# Patient Record
Sex: MALE | Race: WHITE | Employment: FULL TIME | URBAN - METROPOLITAN AREA
[De-identification: names, ages, dates, MRNs, and addresses within clinical notes are randomized per-mention and may not be internally consistent; named-entity substitution may affect disease eponyms.]

---

## 2021-10-07 ENCOUNTER — APPOINTMENT (OUTPATIENT)
Dept: GENERAL RADIOLOGY | Age: 51
End: 2021-10-07
Payer: COMMERCIAL

## 2021-10-07 ENCOUNTER — HOSPITAL ENCOUNTER (EMERGENCY)
Age: 51
Discharge: HOME | End: 2021-10-07
Attending: EMERGENCY MEDICINE
Payer: COMMERCIAL

## 2021-10-07 VITALS
OXYGEN SATURATION: 97 % | DIASTOLIC BLOOD PRESSURE: 84 MMHG | SYSTOLIC BLOOD PRESSURE: 108 MMHG | TEMPERATURE: 98.6 F | BODY MASS INDEX: 24.82 KG/M2 | RESPIRATION RATE: 15 BRPM | WEIGHT: 173 LBS | HEART RATE: 103 BPM

## 2021-10-07 DIAGNOSIS — R07.9 CHEST PAIN, UNSPECIFIED TYPE: Primary | ICD-10-CM

## 2021-10-07 PROBLEM — R93.1 ELEVATED CORONARY ARTERY CALCIUM SCORE: Status: ACTIVE | Noted: 2021-10-07

## 2021-10-07 PROBLEM — R07.2 PRECORDIAL PAIN: Status: ACTIVE | Noted: 2021-10-07

## 2021-10-07 LAB
A/G RATIO: 1.6 (ref 1.1–2.2)
ALBUMIN SERPL-MCNC: 5 G/DL (ref 3.4–5)
ALP BLD-CCNC: 69 U/L (ref 40–129)
ALT SERPL-CCNC: 18 U/L (ref 10–40)
ANION GAP SERPL CALCULATED.3IONS-SCNC: 11 MMOL/L (ref 3–16)
AST SERPL-CCNC: 21 U/L (ref 15–37)
BASOPHILS ABSOLUTE: 0.1 K/UL (ref 0–0.2)
BASOPHILS RELATIVE PERCENT: 1 %
BILIRUB SERPL-MCNC: 0.7 MG/DL (ref 0–1)
BUN BLDV-MCNC: 12 MG/DL (ref 7–20)
CALCIUM SERPL-MCNC: 9.6 MG/DL (ref 8.3–10.6)
CHLORIDE BLD-SCNC: 100 MMOL/L (ref 99–110)
CO2: 26 MMOL/L (ref 21–32)
CREAT SERPL-MCNC: 0.8 MG/DL (ref 0.9–1.3)
EKG ATRIAL RATE: 104 BPM
EKG DIAGNOSIS: NORMAL
EKG P AXIS: 71 DEGREES
EKG P-R INTERVAL: 146 MS
EKG Q-T INTERVAL: 328 MS
EKG QRS DURATION: 72 MS
EKG QTC CALCULATION (BAZETT): 431 MS
EKG R AXIS: 66 DEGREES
EKG T AXIS: 44 DEGREES
EKG VENTRICULAR RATE: 104 BPM
EOSINOPHILS ABSOLUTE: 0.1 K/UL (ref 0–0.6)
EOSINOPHILS RELATIVE PERCENT: 0.9 %
GFR AFRICAN AMERICAN: >60
GFR NON-AFRICAN AMERICAN: >60
GLOBULIN: 3.1 G/DL
GLUCOSE BLD-MCNC: 131 MG/DL (ref 70–99)
HCT VFR BLD CALC: 45.5 % (ref 40.5–52.5)
HEMOGLOBIN: 15.8 G/DL (ref 13.5–17.5)
LYMPHOCYTES ABSOLUTE: 1.4 K/UL (ref 1–5.1)
LYMPHOCYTES RELATIVE PERCENT: 24.2 %
MCH RBC QN AUTO: 29.1 PG (ref 26–34)
MCHC RBC AUTO-ENTMCNC: 34.7 G/DL (ref 31–36)
MCV RBC AUTO: 83.8 FL (ref 80–100)
MONOCYTES ABSOLUTE: 0.4 K/UL (ref 0–1.3)
MONOCYTES RELATIVE PERCENT: 7.6 %
NEUTROPHILS ABSOLUTE: 3.9 K/UL (ref 1.7–7.7)
NEUTROPHILS RELATIVE PERCENT: 66.3 %
PDW BLD-RTO: 13.2 % (ref 12.4–15.4)
PLATELET # BLD: 212 K/UL (ref 135–450)
PMV BLD AUTO: 7 FL (ref 5–10.5)
POTASSIUM SERPL-SCNC: 3.9 MMOL/L (ref 3.5–5.1)
RBC # BLD: 5.43 M/UL (ref 4.2–5.9)
SODIUM BLD-SCNC: 137 MMOL/L (ref 136–145)
TOTAL PROTEIN: 8.1 G/DL (ref 6.4–8.2)
TROPONIN: <0.01 NG/ML
TROPONIN: <0.01 NG/ML
WBC # BLD: 5.8 K/UL (ref 4–11)

## 2021-10-07 PROCEDURE — 99285 EMERGENCY DEPT VISIT HI MDM: CPT

## 2021-10-07 PROCEDURE — 93017 CV STRESS TEST TRACING ONLY: CPT

## 2021-10-07 PROCEDURE — 71045 X-RAY EXAM CHEST 1 VIEW: CPT

## 2021-10-07 PROCEDURE — 84484 ASSAY OF TROPONIN QUANT: CPT

## 2021-10-07 PROCEDURE — 93010 ELECTROCARDIOGRAM REPORT: CPT | Performed by: INTERNAL MEDICINE

## 2021-10-07 PROCEDURE — 80053 COMPREHEN METABOLIC PANEL: CPT

## 2021-10-07 PROCEDURE — 85025 COMPLETE CBC W/AUTO DIFF WBC: CPT

## 2021-10-07 PROCEDURE — 93005 ELECTROCARDIOGRAM TRACING: CPT | Performed by: EMERGENCY MEDICINE

## 2021-10-07 PROCEDURE — 99284 EMERGENCY DEPT VISIT MOD MDM: CPT | Performed by: INTERNAL MEDICINE

## 2021-10-07 RX ORDER — METOPROLOL SUCCINATE 25 MG/1
25 TABLET, EXTENDED RELEASE ORAL DAILY
Qty: 30 TABLET | Refills: 3 | Status: SHIPPED | OUTPATIENT
Start: 2021-10-07 | End: 2022-06-27

## 2021-10-07 ASSESSMENT — PAIN SCALES - GENERAL: PAINLEVEL_OUTOF10: 1

## 2021-10-07 NOTE — ED NOTES
Stress lab called, stated they are ready for patient. Ailyn transported patient to stress lab.      Lino León RN  10/07/21 6409

## 2021-10-07 NOTE — ED NOTES
Called cards @ [phone]   Re: chest pain, abnormal cardiac CT  Dr. Jaky Rojo called back @ [address] [facility]  10/07/21 7385

## 2021-10-07 NOTE — ED PROVIDER NOTES
EMERGENCY DEPARTMENT ENCOUNTER      This patient was not seen and evaluated by the attending physician. Pt Name: Ralph Ch  MRN: [id number]  [place] 1970  Date of evaluation: 10/7/2021  Provider: ANDRAE Lopez - CNP-C  PCP: Katja Weiss MD  ED Attending: Dr. Levy Quinones    History provided by the patient. CHIEF COMPLAINT:     Chief Complaint   Patient presents with    Chest Pain     started last night, left side chest pain,       HISTORY OF PRESENT ILLNESS:      Ralph Ch is a 46 y.o. male who presents [facility]  ED with complaints of chest pain, patient states that he developed chest pain last night, into this morning, describes a left-sided chest pain, radiates across to his chest. Patient says he had a recent CT cardiac which showed a calcium score that was \"off the chart\". Patient has not had any type of cardiac evaluations, stress test or angiography. Does not have an active cardiologist. He is here for further evaluation. Location:-  Quality:-  Severity:-  Duration:-  Modifying factors:-    Nursing Notes were reviewed     REVIEW OF SYSTEMS:     Review of Systems  All systems, atotal of 10, are reviewed and negative except for those that were just noted in history present illness. PAST MEDICAL HISTORY:   History reviewed. No pertinent past medical history. SURGICAL HISTORY:    History reviewed. No pertinent surgical history. CURRENT MEDICATIONS:       Discharge Medication List as of 10/7/2021  2:00 PM      CONTINUE these medications which have NOT CHANGED    Details   Fluticasone Propionate (FLONASE NA) by Nasal routeHistorical Med               ALLERGIES:    Penicillins    FAMILY HISTORY:     History reviewed. No pertinent family history.        SOCIAL HISTORY:       Social History     Socioeconomic History    Marital status:      Spouse name: None    Number of children: None    Years of education: None    Highest education level: bilaterally. No nasal discharge. Oropharynx clear, no erythema. Mucous membranes moist.  EYES: Conjunctiva non-injected, no lid abnormalities noted. No scleral icterus. PERRL. EOM's grossly intact. Anterior chambers clear. NECK: Supple. Normal ROM. No meningismus. No thyroid tenderness or swelling noted. CARDIOVASCULAR: RRR. No Murmer. PULMONARY/CHEST WALL: Effort normal. No tachypnea. Lungs clear to ausculation. ABDOMEN: Normal BS. Soft. Nondistended. No tenderness to palpate. No guarding. No hernias noted. No splenomegaly. Back: Spine is midline. No ecchymosis. No crepitus on palpation. No obvious subluxation of vertebral column. No saddle anesthesia or evidence of cauda equina. /ANORECTAL: Not assessed  MUSKULOSKELETAL: Normal ROM. No acute deformities. No edema. No tenderness to palpate. SKIN: Warm and dry. NEUROLOGICAL:  GCS 15. CN II-XII grossly intact. Strength is 5/5 in allextremities and sensation is intact. PSYCHIATRIC: Normal affect, normal insight and judgement. Alert andoriented x 3.         DIAGNOSTIC RESULTS:     LABS:    Results for orders placed or performed during the hospital encounter of 10/07/21   CBC Auto Differential   Result Value Ref Range    WBC 5.8 4.0 - 11.0 K/uL    RBC 5.43 4.20 - 5.90 M/uL    Hemoglobin 15.8 13.5 - 17.5 g/dL    Hematocrit 45.5 40.5 - 52.5 %    MCV 83.8 80.0 - 100.0 fL    MCH 29.1 26.0 - 34.0 pg    MCHC 34.7 31.0 - 36.0 g/dL    RDW 13.2 12.4 - 15.4 %    Platelets 722 712 - 530 K/uL    MPV 7.0 5.0 - 10.5 fL    Neutrophils % 66.3 %    Lymphocytes % 24.2 %    Monocytes % 7.6 %    Eosinophils % 0.9 %    Basophils % 1.0 %    Neutrophils Absolute 3.9 1.7 - 7.7 K/uL    Lymphocytes Absolute 1.4 1.0 - 5.1 K/uL    Monocytes Absolute 0.4 0.0 - 1.3 K/uL    Eosinophils Absolute 0.1 0.0 - 0.6 K/uL    Basophils Absolute 0.1 0.0 - 0.2 K/uL   Comprehensive Metabolic Panel   Result Value Ref Range    Sodium 137 136 - 145 mmol/L    Potassium 3.9 3.5 - 5.1 mmol/L    Chloride 100 99 - 110 mmol/L    CO2 26 21 - 32 mmol/L    Anion Gap 11 3 - 16    Glucose 131 (H) 70 - 99 mg/dL    BUN 12 7 - 20 mg/dL    CREATININE 0.8 (L) 0.9 - 1.3 mg/dL    GFR Non-African American >60 >60    GFR African American >60 >60    Calcium 9.6 8.3 - 10.6 mg/dL    Total Protein 8.1 6.4 - 8.2 g/dL    Albumin 5.0 3.4 - 5.0 g/dL    Albumin/Globulin Ratio 1.6 1.1 - 2.2    Total Bilirubin 0.7 0.0 - 1.0 mg/dL    Alkaline Phosphatase 69 40 - 129 U/L    ALT 18 10 - 40 U/L    AST 21 15 - 37 U/L    Globulin 3.1 g/dL   Troponin   Result Value Ref Range    Troponin <0.01 <0.01 ng/mL   Troponin   Result Value Ref Range    Troponin <0.01 <0.01 ng/mL   EKG 12 Lead   Result Value Ref Range    Ventricular Rate 104 BPM    Atrial Rate 104 BPM    P-R Interval 146 ms    QRS Duration 72 ms    Q-T Interval 328 ms    QTc Calculation (Bazett) 431 ms    P Axis 71 degrees    R Axis 66 degrees    T Axis 44 degrees    Diagnosis       Sinus tachycardiaPossible Left atrial enlargementBorderline ECGNo previous ECGs availableConfirmed by Yvonne Freed ([id number]) on 10/7/2021 12:39:40 PM         RADIOLOGY:  All x-ray studies are viewed/reviewedby me. Formal interpretations per the radiologist are as follows:      XR CHEST PORTABLE   Final Result   1. No acute abnormality. EKG:  See EKG interpretation by an attending phsyician      PROCEDURES:   N/A    CRITICAL CARE TIME:   Total critical care time today provided was at least 35 minutes. This excludes seperately billable procedure. Critical care time provided for chest pain concerning for possible ACS that got a stress test from ER that required close evaluation and/or intervention with concern for patient decompensation.      CONSULTS:  IP CONSULT TO CARDIOLOGY      EMERGENCYDEPARTMENT COURSE and DIFFERENTIAL DIAGNOSIS/MDM:   Vitals:    Vitals:    10/07/21 1153 10/07/21 1302 10/07/21 1332 10/07/21 1401   BP: 129/85 117/81 118/78 108/84   Pulse: 91 107 102 103   Resp: 16 13 23 15   Temp: TempSrc:       SpO2: 98% 96% 96% 97%   Weight:           Patient was given the following medications:  Medications - No data to display      Patient was evaluated by both myself and Dr. Danae Lockwood  Patient presented to the emergency room today with complaints of chest pain. Patient has considerable risk factors for acute coronary syndrome, I did consult cardiology on this patient, spoke with Dr. Aruna Mcrae, he did come to the bedside and evaluated the patient, feels that a stress test from the ER is appropriate for this patient. Patient laboratory studies showed 2 - troponins, otherwise normal labs. Patient had a exercise stress today which was interpreted by cardiology, the did contact me personally to go over results, they do feel comfortable with discharge home, normal stress test.  Patient was given referral to follow-up with cardiology as an outpatient. He was given strict return precautions, he verbalized understanding of the plan of care and agreed with it. Patient laboratory studies, radiographic imaging, andassessment were all discussed with the patient and/or patient family. There was shared decision-making between myself, the attending physician, as well as the patient and/or their surrogate and we are all in agreement with discharge home. There was an opportunity for questions and all questions were answered to the best of my ability and to the satisfaction of the patient and/or patient family. FINAL IMPRESSION:      1.  Chest pain, unspecified type          DISPOSITION/PLAN:   DISPOSITIONDecision To Discharge      PATIENT REFERRED TO:  MD Anil Marx [address]  [address] [facility]  [phone]    Call   For follow up      DISCHARGE MEDICATIONS:  Discharge Medication List as of 10/7/2021  2:00 PM      START taking these medications    Details   metoprolol succinate (TOPROL XL) 25 MG extended release tablet Take 1 tablet by mouth daily, Disp-30 tablet, R-3Print (Please note that portions of this note were completed with a voice recognition program.  Efforts were made to edit the dictations, but occasionally words are mis-transcribed.)    ANDRAE Crooks (electronically signed)        ANDRAE Crooks CNP  10/08/21 6121

## 2021-10-07 NOTE — ED NOTES
Verbal and written discharge instructions given. IV and telemetry monitor removed. Prescription given to patient. Patient ambulated out of department. Patient in stable condition, discharged home.        Erin Cardenas RN  10/07/21 1459

## 2021-10-07 NOTE — CONSULTS
[address] [facility]   Cardiovascular Evaluation    PATIENT: Regina Wilson  DATE: 10/7/2021  MRN: [id number]  CSN: [id number]  : 1970    Primary Care Doctor/Referring provider: Teresa Antony MD, No admitting provider for patient encounter. Reason for evaluation/Chief complaint:   Chest Pain (started last night, left side chest pain,)      Subjective:    History of present illness on initial date of evaluation:   Regina Wilson is a 46 y.o. patient who presents for the evaluation of chest pain. Patient states that he had noted onset of left-sided chest pain with stress. Patient had noticed intermittent symptoms for several weeks but did not seem concerned. He states that he visited his primary care provider couple weeks ago and was referred for CT calcium score. He got the results of his CT calcium score a couple days ago and since that time his had increasing concern and progressive symptoms. The symptoms were prompted him to seek medical evaluation in the emergency room. In the emergency room here his initial EKG is unremarkable. He does have normal troponin levels. He is currently chest pain-free. We have been asked to review the patient's clinical situation and determine need for further cardiovascular investigation and testing. In discussing with the patient, he states that he does have chest pain located on the left side with occasional radiation to the left arm. This is typically with stressful situations. He states that he got little bit more worried about his symptomatology after getting his calcium score. Patient Active Problem List   Diagnosis    Precordial pain    Elevated coronary artery calcium score         Cardiac Testing: I have reviewed the findings below. EKG:  ECHO:   STRESS TEST:  CATH:  BYPASS:  VASCULAR:    Past Medical History:   has no past medical history on file. Surgical History:   has no past surgical history on file.      Social History:   reports that he has never smoked. He has never used smokeless tobacco. He reports that he does not drink alcohol and does not use drugs. Family History:  No evidence for sudden cardiac death or premature CAD    Medications:  Reviewed and are listed in nursing record. and/or listed below  Outpatient Medications:  Prior to Admission medications    Medication Sig Start Date End Date Taking? Authorizing Provider   Fluticasone Propionate (FLONASE NA) by Nasal route    Historical Provider, MD       In-patient schedule medications:        Infusion Medications: Allergies:  Penicillins     Review of Systems:   All 14 point review of symptoms completed. Pertinent positives identified in the HPI, all other review of symptoms findings as below.      Review of Systems - History obtained from the patient  General ROS: negative for - chills, fever or night sweats  Psychological ROS: negative for - disorientation or hallucinations  Ophthalmic ROS: negative for - dry eyes, eye pain or loss of vision  ENT ROS: negative for - nasal discharge or sore throat  Allergy and Immunology ROS: negative for - hives or itchy/watery eyes  Hematological and Lymphatic ROS: negative for - jaundice or night sweats  Endocrine ROS: negative for - mood swings or temperature intolerance  Breast ROS: deferred  Respiratory ROS: negative for - hemoptysis or stridor  Gastrointestinal ROS: no abdominal pain, change in bowel habits, or black or bloody stools  Genito-Urinary ROS: no dysuria, trouble voiding, or hematuria  Musculoskeletal ROS: negative for - gait disturbance, joint pain or joint stiffness  Neurological ROS: negative for - seizures or speech problems  Dermatological ROS: negative for - rash or skin lesion changes      Physical Examination:    [unfilled]  /85   Pulse 91   Temp 98.6 °F (37 °C) (Oral)   Resp 16   Wt 173 lb (78.5 kg)   SpO2 98%   BMI 24.82 kg/m²    Weight: 173 lb (78.5 kg)     Wt Readings from Last 3 Encounters:   10/07/21 173 lb (78.5 kg)   02/15/18 175 lb (79.4 kg)     No intake or output data in the 24 hours ending 10/07/21 1219    General Appearance:  Alert, cooperative, no distress, appears stated age   Head:  Normocephalic, without obvious abnormality, atraumatic   Eyes:  PERRL, conjunctiva/corneas clear       Nose: Nares normal, no drainage or sinus tenderness   Throat: Lips, mucosa, and tongue normal   Neck: Supple, symmetrical, trachea midline, no adenopathy, thyroid: not enlarged, symmetric, no tenderness/mass/nodules, no carotid bruit or JVD       Lungs:   Clear to auscultation bilaterally, respirations unlabored   Chest Wall:  No tenderness or deformity   Heart:  Regular rhythm and normal rate; S1, S2 are normal; no murmur noted; no rub or gallop   Abdomen:   Soft, non-tender, bowel sounds active all four quadrants,  no masses, no organomegaly           Extremities: Extremities normal, atraumatic, no cyanosis or edema   Pulses: 2+ and symmetric   Skin: Skin color, texture, turgor normal, no rashes or lesions   Pysch: Normal mood and affect   Neurologic: Normal gross motor and sensory exam.         Labs  Recent Labs     10/07/21  0802   WBC 5.8   HGB 15.8   HCT 45.5   MCV 83.8        Recent Labs     10/07/21  0802   CREATININE 0.8*   BUN 12      K 3.9      CO2 26     No results for input(s): INR, PROTIME in the last 72 hours. Recent Labs     10/07/21  0802 10/07/21  1117   TROPONINI <0.01 <0.01     Invalid input(s): PRO-BNP  No results for input(s): CHOL, HDL in the last 72 hours. Invalid input(s): LDL, TG      Imaging:  I have reviewed the below testing personally and my interpretation is below. EKG:   Sinus tachycardia  Possible Left atrial enlargement  Borderline ECG  No previous ECGs available    CXR:      Assessment:  46 y.o. patient with:  Active Problems:    Precordial pain    Elevated coronary artery calcium score  Resolved Problems:    * No resolved hospital problems. *          Plan:  1.  The patient's symptoms and associated risk factors suggest an intermediate probability of ischemic heart disease as the cause for the symptoms. I recommend the patient undergo non-invasive evaluation with stress testing to further evaluate the symptoms. ~with normal EKG will proceed with plain GXT   2. Serial troponin levels will be ordered and reviewed  3. The patient has been given instructions on addressing diet, regular exercise, weight control, smoking abstention, medication compliance, and stress minimization. 4. The patient should be treated with these therapies unless contraindicated:   ~asa daily   ~high intensity statin therapy  5. After review of these tests, further recommendations regarding care will be given. Medical Decision Making: The following items were considered in medical decision making:  Independent review of images  Review / order clinical lab tests  Review / order radiology tests  Decision to obtain old records  Review and summation of old records as accessed through [place] (a summary of my findings in these old records)      Time Based Itemization  A total of 60 minutes was spent on today's patient encounter. If applicable, non-patient-facing activities:  (X )Preparing to see the patient and reviewing records  (X ) Individual interpretation of results  ( ) Discussion or coordination of care with other health care professionals  ( ) Ordering of unique tests, medications, or procedures  (X ) Documentation within the EHR       All questions and concerns were addressed to the patient/family. Alternatives to my treatment were discussed. The note was completed using EMR. Every effort was made to ensure accuracy; however, inadvertent computerized transcription errors may be present.     Cam Bansal MD, [phone], [place] [phone] [facility]  [phone] [facility]  10/7/2021  12:19 PM

## 2021-10-08 NOTE — ED PROVIDER NOTES
Emergency Department Attending Provider Note  Location: [address] [place] [address]  ED  10/7/2021     Patient Identification  Mika Nair is a 46 y.o. male      Mika Nair was evaluated in the Emergency Department for exertional chest pain over the past few weeks. CT calcium score elevated ordered by primary physician. No active CP. Physical exam revealed:  Vital signs reviewed  Gen: Alert and oriented, no acute distress  Card: RRR, no murmurs, equal radial pulses  Resp: CBSBL, no wheezes rales or rhonchi  Abd: Soft nontender, nondistended abdomen, no guarding or rebound, no CVA tenderness  Ext: No deformities noted  Neuro: Grossly normal moving extremities equally      EKG Interpretation  Normal sinus rhythm rate 100 normal axis normal intervals no evidence of conduction abnormalities or diagnostic ischemic changes noted,     Patient seen and evaluated. Relevant records reviewed. 43-year-old male presents with exertional chest pain over the past few weeks and elevated CT calcium score. No active chest pain. Patient well-appearing on exam borderline tachycardic otherwise reassuring vitals. Unremarkable physical exam.  EKG does not show any acute ischemic pattern negative troponin x2. NAZIA discussed case with cardiology who recommends stress test today and plan for discharge with appropriate follow-up outpatient stress test is negative. I was looking for PE dissection on T preliminary process require emergent intervention. Although initial history and physical exam information was obtained by NAZIA/NPP/MD/DO (who also dictated a record of this visit), I independently examined and evaluated this patient and made all diagnostic, treatment, and disposition decisions. This chart was generated in part by using Dragon Dictation system and may contain errors related to that system including errors in grammar, punctuation, and spelling, as well as words and phrases that may be inappropriate.  If there are any questions or concerns please feel free to contact the dictating provider for clarification.      Andria Gardiner MD  [address] MD  10/08/21 9950

## 2021-10-13 ENCOUNTER — OFFICE VISIT (OUTPATIENT)
Dept: CARDIOLOGY CLINIC | Age: 51
End: 2021-10-13
Payer: COMMERCIAL

## 2021-10-13 VITALS
SYSTOLIC BLOOD PRESSURE: 118 MMHG | WEIGHT: 176 LBS | DIASTOLIC BLOOD PRESSURE: 76 MMHG | HEART RATE: 81 BPM | OXYGEN SATURATION: 98 % | HEIGHT: 70 IN | BODY MASS INDEX: 25.2 KG/M2

## 2021-10-13 DIAGNOSIS — R07.2 PRECORDIAL PAIN: Primary | ICD-10-CM

## 2021-10-13 DIAGNOSIS — R93.1 ELEVATED CORONARY ARTERY CALCIUM SCORE: ICD-10-CM

## 2021-10-13 PROCEDURE — G8427 DOCREV CUR MEDS BY ELIG CLIN: HCPCS | Performed by: INTERNAL MEDICINE

## 2021-10-13 PROCEDURE — G8419 CALC BMI OUT NRM PARAM NOF/U: HCPCS | Performed by: INTERNAL MEDICINE

## 2021-10-13 PROCEDURE — 1036F TOBACCO NON-USER: CPT | Performed by: INTERNAL MEDICINE

## 2021-10-13 PROCEDURE — 99214 OFFICE O/P EST MOD 30 MIN: CPT | Performed by: INTERNAL MEDICINE

## 2021-10-13 PROCEDURE — G8484 FLU IMMUNIZE NO ADMIN: HCPCS | Performed by: INTERNAL MEDICINE

## 2021-10-13 PROCEDURE — 3017F COLORECTAL CA SCREEN DOC REV: CPT | Performed by: INTERNAL MEDICINE

## 2021-10-13 RX ORDER — ATORVASTATIN CALCIUM 20 MG/1
20 TABLET, FILM COATED ORAL DAILY
COMMUNITY
Start: 2021-10-05 | End: 2021-11-09 | Stop reason: SDUPTHER

## 2021-10-13 NOTE — PROGRESS NOTES
[address] [facility]   Cardiovascular Evaluation    PATIENT: Jhoana Pascual  DATE: 10/13/2021  MRN: <[id number]>  CSN: [id number]  : 1970    Primary Care Doctor/Referring provider: Ronny Patel MD, No admitting provider for patient encounter. Reason for evaluation/Chief complaint:   Follow-Up from Hospital      Subjective: Mr Leonard Antonio presents for follow up for chest pain. History of present illness on initial date of evaluation:   Jhoana Pascual is a 46 y.o. presented to the ED on 10/7/2021 with complaints of chest pain. Prior to ED visit, he had seen his PCP who ordered a CT calcium score which demonstrated a total calcium score of 897. A stress test was ordered in the ED. Patient underwent stress test on 10/7/2021 which was normal.   Today, he states that he feels well. He states that he does have a lot of anxiety and stress. He also wonders if his chest pain is related to a pulled muscle from exercise. He has recently changed his diet to low sodium and low sugar. He reports that he is taking his medications as prescribed. Patient denies current edema, sob, palpitations, dizziness or syncope. Patient Active Problem List   Diagnosis    Precordial pain    Elevated coronary artery calcium score         Cardiac Testing: I have reviewed the findings below. EKG:  ECHO:   STRESS TEST: 10/7/2021  Summary   Normal exercise EKG. Hensley prognostic treadmill score suggests low-risk (score in range of >= +5). 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       Past Medical History:   has no past medical history on file. Surgical History:   has no past surgical history on file. Social History:   reports that he has never smoked.  He has never used smokeless tobacco. He reports that he does not drink alcohol and does not use drugs. Family History:  No evidence for sudden cardiac death or premature CAD    Medications:  Reviewed and are listed in nursing record. and/or listed below  Outpatient Medications:  Prior to Admission medications    Medication Sig Start Date End Date Taking? Authorizing Provider   atorvastatin (LIPITOR) 20 MG tablet Take 20 mg by mouth daily 10/5/21  Yes Historical Provider, MD   Fluticasone Propionate (FLONASE NA) by Nasal route   Yes Historical Provider, MD   metoprolol succinate (TOPROL XL) 25 MG extended release tablet Take 1 tablet by mouth daily  Patient not taking: Reported on 10/13/2021 10/7/21   ANDRAE Santa CNP       In-patient schedule medications:        Infusion Medications: Allergies:  Penicillins     Review of Systems:   All 14 point review of symptoms completed. Pertinent positives identified in the HPI, all other review of symptoms findings as below.      Review of Systems - History obtained from the patient  General ROS: negative for - chills, fever or night sweats  Psychological ROS: negative for - disorientation or hallucinations  Ophthalmic ROS: negative for - dry eyes, eye pain or loss of vision  ENT ROS: negative for - nasal discharge or sore throat  Allergy and Immunology ROS: negative for - hives or itchy/watery eyes  Hematological and Lymphatic ROS: negative for - jaundice or night sweats  Endocrine ROS: negative for - mood swings or temperature intolerance  Breast ROS: deferred  Respiratory ROS: negative for - hemoptysis or stridor  Gastrointestinal ROS: no abdominal pain, change in bowel habits, or black or bloody stools  Genito-Urinary ROS: no dysuria, trouble voiding, or hematuria  Musculoskeletal ROS: negative for - gait disturbance, joint pain or joint stiffness  Neurological ROS: negative for - seizures or speech problems  Dermatological ROS: negative for - rash or skin lesion changes      Physical Examination:    /76   Pulse 81   Ht 5' 10\" (1.778 m)   Wt 176 lb (79.8 kg)   SpO2 98%   BMI 25.25 kg/m²   /76   Pulse 81   Ht 5' 10\" (1.778 m)   Wt 176 lb (79.8 kg)   SpO2 98%   BMI 25.25 kg/m²    Weight: 176 lb (79.8 kg)     Wt Readings from Last 3 Encounters:   10/13/21 176 lb (79.8 kg)   10/07/21 173 lb (78.5 kg)   02/15/18 175 lb (79.4 kg)     No intake or output data in the 24 hours ending 10/13/21 1333    General Appearance:  Alert, cooperative, no distress, appears stated age   Head:  Normocephalic, without obvious abnormality, atraumatic   Eyes:  PERRL, conjunctiva/corneas clear       Nose: Nares normal, no drainage or sinus tenderness   Throat: Lips, mucosa, and tongue normal   Neck: Supple, symmetrical, trachea midline, no adenopathy, thyroid: not enlarged, symmetric, no tenderness/mass/nodules, no carotid bruit or JVD       Lungs:   Clear to auscultation bilaterally, respirations unlabored   Chest Wall:  No tenderness or deformity   Heart:  Regular rhythm and normal rate; S1, S2 are normal; no murmur noted; no rub or gallop   Abdomen:   Soft, non-tender, bowel sounds active all four quadrants,  no masses, no organomegaly           Extremities: Extremities normal, atraumatic, no cyanosis or edema   Pulses: 2+ and symmetric   Skin: Skin color, texture, turgor normal, no rashes or lesions   Pysch: Normal mood and affect   Neurologic: Normal gross motor and sensory exam.         Labs  No results for input(s): WBC, HGB, HCT, MCV, PLT in the last 72 hours. No results for input(s): CREATININE, BUN, NA, K, CL, CO2 in the last 72 hours. No results for input(s): INR, PROTIME in the last 72 hours. No results for input(s): TROPONINI in the last 72 hours. Invalid input(s): PRO-BNP  No results for input(s): CHOL, HDL in the last 72 hours.     Invalid input(s): LDL, TG      Imaging:  I have reviewed the below testing personally and my interpretation is below.  EKG:  CXR:      Assessment:  46 y.o. patient with:  Active Problems:    * No active hospital problems. *  Resolved Problems:    * No resolved hospital problems. *      Problem List Items Addressed This Visit     Precordial pain - Primary    Elevated coronary artery calcium score    Relevant Medications    atorvastatin (LIPITOR) 20 MG tablet          Plan:  1. Encourage activity and healthy diet as tolerated. Ok to resume activity as before. 2. Discussed risks and benefits of angiogram- patient would like to defer for now. 3. Ok to hold metoprolol for now in light of diet modifications. He never started this medication. 4. Follow up in 6 months. Mc West RN, am scribing for and in the presence of . 10/13/21 1:33 PM   Audra Fonseca RN      I, Dr. Lemus Both, personally performed the services described in this documentation, as scribed by the above signed scribe in my presence. It is both accurate and complete to my knowledge. I agree with the details independently gathered by the clinical support staff, while the remaining scribed note accurately describes my personal service to the patient. Medical Decision Making: The following items were considered in medical decision making:  Independent review of images  Review / order clinical lab tests  Review / order radiology tests  Decision to obtain old records  Review and summation of old records as accessed through [facility] (a summary of my findings in these old records)      Time Based Itemization  A total of 35 minutes was spent on today's patient encounter.   If applicable, non-patient-facing activities:  (X)Preparing to see the patient and reviewing records  (X) Individual interpretation of results  ( ) Discussion or coordination of care with other health care professionals  () Ordering of unique tests, medications, or procedures  (X) Documentation within the EHR               All questions and concerns were addressed to the patient/family. Alternatives to my treatment were discussed. The note was completed using EMR. Every effort was made to ensure accuracy; however, inadvertent computerized transcription errors may be present.     Nicholas Landau, MD, [id number], [facility]  [phone] [facility]  [phone] [facility]  10/13/2021  1:33 PM

## 2021-10-13 NOTE — PATIENT INSTRUCTIONS
Patient Education        Angiogram: Before Your Procedure  What is an angiogram?  An angiogram is an X-ray test that uses dye and a camera to take pictures of the blood flow in an artery or a vein. An angiogram can be used to look at the arteries or veins in the head, arms, legs, chest, back, or belly. This test is done to look for problems in the arteries or veins. An angiogram is done for many reasons. For example, you may have this test to find the source of bleeding, such as an ulcer. Or it may be done to look for blocked blood vessels in your lungs. During an angiogram, the doctor will put a thin, flexible tube into a blood vessel in your groin or arm. This tube is called a catheter. The doctor guides the tube to the blood vessel that will be studied. Then a dye is injected through the tube to make the area easier to see. X-rays or pictures are taken of the area. You will be given medicine to make you sleepy and comfortable during the test. You may or may not need to stay in the hospital overnight. You will stay in a room for at least a few hours to make sure the catheter site starts to heal.  How do you prepare for the procedure? Procedures can be stressful. This information will help you understand what you can expect. And it will help you safely prepare for your procedure. Preparing for the procedure    · Do not eat or drink for 4 to 8 hours before the angiogram.     · Be sure you have someone to take you home. Anesthesia and pain medicine will make it unsafe for you to drive or get home on your own.     · Understand exactly what procedure is planned, along with the risks, benefits, and other options.     · Tell your doctor ALL the medicines, vitamins, supplements, and herbal remedies you take. Some may increase the risk of problems during your procedure.  Your doctor will tell you if you should stop taking any of them before the procedure and how soon to do it.     · If you take aspirin or some other blood thinner, ask your doctor if you should stop taking it before your procedure. Make sure that you understand exactly what your doctor wants you to do. These medicines increase the risk of bleeding.     · Make sure your doctor and the hospital have a copy of your advance directive. If you don't have one, you may want to prepare one. It lets others know your health care wishes. It's a good thing to have before any type of surgery or procedure. What happens on the day of the procedure? · Follow the instructions exactly about when to stop eating and drinking. If you don't, your procedure may be canceled. If your doctor told you to take your medicines on the day of the procedure, take them with only a sip of water.     · Take a bath or shower before you come in for your procedure. Do not apply lotions, perfumes, deodorants, or nail polish.     · Take off all jewelry and piercings. And take out contact lenses, if you wear them. At the hospital or surgery center   · Bring a picture ID.     · You may get medicine that relaxes you or puts you in a light sleep. The area being worked on will be numb.     · The procedure will take about 1 to 3 hours.     · After the procedure, pressure will be applied to the area where the catheter was put in your blood vessel. Then the area may be covered with a bandage or a compression device. This will prevent bleeding.     · Nurses will check your heart rate and blood pressure. The nurse will also check the catheter site for bleeding.     · If the catheter was put in your groin, you will need to lie still and keep your leg straight for several hours. The nurse may put a weighted bag on your leg to keep it still.     · If the catheter was put in your arm, you may be able to sit up and get out of bed right away. But you will need to keep your arm still for at least 1 hour.     · You may have a bruise or a small lump where the catheter was put in your blood vessel.  This is normal and will go away. When should you call your doctor? · You have questions or concerns.     · You don't understand how to prepare for your procedure.     · You become ill before the procedure (such as fever, flu, or a cold).     · You need to reschedule or have changed your mind about having the procedure. Where can you learn more? Go to https://Spectral Diagnosticspepiceweb.[employer]. org and sign in to your [employer] account. Enter K094 in the [employer] box to learn more about \"Angiogram: Before Your Procedure. \"     If you do not have an account, please click on the \"Sign Up Now\" link. Current as of: April 29, 2021               Content Version: 13.0  © 2006-2021 [employer]. Care instructions adapted under license by [facility] ([facility]). If you have questions about a medical condition or this instruction, always ask your healthcare professional. [place] [address] any warranty or liability for your use of this information. Patient Education        Angiogram: What to Expect at Home  Your Recovery  An angiogram is an X-ray test that uses dye and a camera to take pictures of the blood flow in an artery or a vein. The doctor inserted a thin, flexible tube (catheter) into a blood vessel in your groin. In some cases, the catheter is placed in a blood vessel in the arm. An angiogram is done for many reasons. For example, you may have an angiogram to find the source of bleeding, such as an ulcer. Or it may be done to look for blocked blood vessels in your lungs. After an angiogram, your groin or arm may have a bruise and feel sore for a day or two. You can do light activities around the house but nothing strenuous for several days. Your doctor may give you specific instructions on when you can do your normal activities again, such as driving and going back to work. This care sheet gives you a general idea about how long it will take for you to recover.  But each person recovers at a different pace. Follow the steps below to feel better as quickly as possible. How can you care for yourself at home? Activity    · Do not do strenuous exercise and do not lift, pull, or push anything heavy until your doctor says it is okay. This may be for a day or two. You can walk around the house and do light activity, such as cooking.     · If the catheter was placed in your groin, try not to walk up stairs for the first couple of days.     · If the catheter was placed in your arm near your wrist, do not bend your wrist deeply for the first couple of days. Be careful using your hand to get into and out of a chair or bed.     · If your doctor recommends it, get more exercise. Walking is a good choice. Bit by bit, increase the amount you walk every day. Try for at least 30 minutes on most days of the week. Diet    · Drink plenty of fluids to help your body flush out the dye. If you have kidney, heart, or liver disease and have to limit fluids, talk with your doctor before you increase the amount of fluids you drink.     · You can eat your normal diet. If your stomach is upset, try bland, low-fat foods like plain rice, broiled chicken, toast, and yogurt. Medicines    · Be safe with medicines. Read and follow all instructions on the label. ? If the doctor gave you a prescription medicine for pain, take it as prescribed. ? If you are not taking a prescription pain medicine, ask your doctor if you can take an over-the-counter medicine.     · If you take aspirin or some other blood thinner, ask your doctor if and when to start taking it again. Make sure that you understand exactly what your doctor wants you to do.     · Your doctor will tell you if and when you can restart your medicines. He or she will also give you instructions about taking any new medicines. Care of the catheter site    · You will have a dressing over the cut (incision). A dressing helps the incision heal and protects it. Your doctor will tell you how to take care of this.     · Put ice or a cold pack on the area for 10 to 20 minutes at a time to help with soreness or swelling. Put a thin cloth between the ice and your skin.     · You may shower 24 to 48 hours after the procedure, if your doctor okays it. Pat the incision dry.     · Do not soak the catheter site until it is healed. Don't take a bath for 1 week, or until your doctor tells you it is okay.     · Watch for bleeding from the site. A small amount of blood (up to the size of a quarter) on the bandage can be normal.     · If you are bleeding, lie down and press on the area for 15 minutes to try to make it stop. If the bleeding does not stop, call your doctor or seek immediate medical care. Follow-up care is a key part of your treatment and safety. Be sure to make and go to all appointments, and call your doctor if you are having problems. It's also a good idea to know your test results and keep a list of the medicines you take. When should you call for help? Call 911 anytime you think you may need emergency care. For example, call if:    · You passed out (lost consciousness).     · You have severe trouble breathing.     · You have sudden chest pain and shortness of breath, or you cough up blood. Call your doctor now or seek immediate medical care if:    · You are bleeding from the area where the catheter was put in your artery.     · You have a fast-growing, painful lump at the catheter site.     · You have signs of infection, such as:  ? Increased pain, swelling, warmth, or redness. ? Red streaks leading from the incision. ? Pus draining from the incision. ? A fever. Watch closely for any changes in your health, and be sure to contact your doctor if:    · You don't get better as expected. Where can you learn more? Go to https://Thomas-KrennplacidoOh My Green!.[employer]. org and sign in to your [employer] account.  Enter T778 in the [employer] box to learn more about \"Angiogram: What to Expect at Home. \"     If you do not have an account, please click on the \"Sign Up Now\" link. Current as of: June 17, 2021               Content Version: 13.0  © 2006-2021 [employer]. Care instructions adapted under license by [facility] ([facility]). If you have questions about a medical condition or this instruction, always ask your healthcare professional. [address] any warranty or liability for your use of this information.

## 2021-11-05 ENCOUNTER — PATIENT MESSAGE (OUTPATIENT)
Dept: CARDIOLOGY CLINIC | Age: 51
End: 2021-11-05

## 2021-11-05 NOTE — TELEPHONE ENCOUNTER
From: Jennifer Bailey  To: Ray Rahman MD  Sent: 11/5/2021 8:52 AM EDT  Subject: Prescription Question    My current and soon to be former family doctor is not responding to getting my statin medication refilled. Currently I am on atorvastatin 20mg. Can you write a prescription for me?

## 2021-11-09 RX ORDER — ATORVASTATIN CALCIUM 20 MG/1
20 TABLET, FILM COATED ORAL DAILY
Qty: 90 TABLET | Refills: 3 | Status: SHIPPED | OUTPATIENT
Start: 2021-11-09 | End: 2022-06-27

## 2021-11-09 NOTE — TELEPHONE ENCOUNTER
Pt wants atorvastatin (LIPITOR) 20 MG tablet sent to [facility] pt sts he had updated # to the pharmacy [phone]    Message per [address] covering for [facility] said ok to fill.  TY

## 2022-03-04 ENCOUNTER — PATIENT MESSAGE (OUTPATIENT)
Dept: CARDIOLOGY CLINIC | Age: 52
End: 2022-03-04

## 2022-03-04 DIAGNOSIS — R07.2 PRECORDIAL PAIN: ICD-10-CM

## 2022-03-04 DIAGNOSIS — R93.1 ELEVATED CORONARY ARTERY CALCIUM SCORE: Primary | ICD-10-CM

## 2022-03-08 ENCOUNTER — HOSPITAL ENCOUNTER (OUTPATIENT)
Age: 52
Discharge: HOME | End: 2022-03-08
Payer: COMMERCIAL

## 2022-03-08 DIAGNOSIS — R07.2 PRECORDIAL PAIN: ICD-10-CM

## 2022-03-08 DIAGNOSIS — R93.1 ELEVATED CORONARY ARTERY CALCIUM SCORE: ICD-10-CM

## 2022-03-08 LAB
CHOLESTEROL, FASTING: 169 MG/DL (ref 0–199)
HDLC SERPL-MCNC: 69 MG/DL (ref 40–60)
LDL CHOLESTEROL CALCULATED: 91 MG/DL
TRIGLYCERIDE, FASTING: 47 MG/DL (ref 0–150)
VLDLC SERPL CALC-MCNC: 9 MG/DL

## 2022-03-08 PROCEDURE — 36415 COLL VENOUS BLD VENIPUNCTURE: CPT

## 2022-03-08 PROCEDURE — 80061 LIPID PANEL: CPT

## 2022-06-27 ENCOUNTER — OFFICE VISIT (OUTPATIENT)
Dept: CARDIOLOGY CLINIC | Age: 52
End: 2022-06-27
Payer: COMMERCIAL

## 2022-06-27 VITALS
OXYGEN SATURATION: 98 % | BODY MASS INDEX: 22.48 KG/M2 | SYSTOLIC BLOOD PRESSURE: 106 MMHG | HEIGHT: 70 IN | HEART RATE: 65 BPM | DIASTOLIC BLOOD PRESSURE: 68 MMHG | WEIGHT: 157 LBS

## 2022-06-27 DIAGNOSIS — I25.10 CORONARY ARTERY DISEASE INVOLVING NATIVE CORONARY ARTERY OF NATIVE HEART WITHOUT ANGINA PECTORIS: Primary | ICD-10-CM

## 2022-06-27 DIAGNOSIS — E78.2 MIXED HYPERLIPIDEMIA: ICD-10-CM

## 2022-06-27 PROCEDURE — G8427 DOCREV CUR MEDS BY ELIG CLIN: HCPCS | Performed by: NURSE PRACTITIONER

## 2022-06-27 PROCEDURE — G8420 CALC BMI NORM PARAMETERS: HCPCS | Performed by: NURSE PRACTITIONER

## 2022-06-27 PROCEDURE — 1036F TOBACCO NON-USER: CPT | Performed by: NURSE PRACTITIONER

## 2022-06-27 PROCEDURE — 99214 OFFICE O/P EST MOD 30 MIN: CPT | Performed by: NURSE PRACTITIONER

## 2022-06-27 PROCEDURE — 3017F COLORECTAL CA SCREEN DOC REV: CPT | Performed by: NURSE PRACTITIONER

## 2022-06-27 RX ORDER — ASPIRIN 81 MG/1
81 TABLET, CHEWABLE ORAL DAILY
COMMUNITY

## 2022-06-27 RX ORDER — DIAPER,BRIEF,YOUTH,DISPOSABLE
1200 EACH MISCELLANEOUS
COMMUNITY

## 2022-06-27 RX ORDER — ROSUVASTATIN CALCIUM 40 MG/1
40 TABLET, COATED ORAL DAILY
COMMUNITY
Start: 2022-06-15

## 2022-06-27 ASSESSMENT — ENCOUNTER SYMPTOMS
RESPIRATORY NEGATIVE: 1
GASTROINTESTINAL NEGATIVE: 1

## 2022-06-27 NOTE — PROGRESS NOTES
[address]   Cardiology Note              Date:  June 27, 2022  Patientname: Mirela Whittington  YOB: 1970    Primary Care physician: Kristi Hammond MD    HISTORY OF PRESENT ILLNESS: Mirela Whittington is a 45 y.o. male with a history of CAD and HLD. He had a CT calcium score of 897 in 10/2021. Plain GXT negative for ischemia. Today he presents for follow up for CAD, HLD. He feels good. Denies chest pain, shortness of breath, palpitations and dizziness. He exercises msot days of the week, does 30-45 minutes of strengthing and about 15-30 minutes of cardio. Tolerates activity well. He is following a vegan diet. He is concerned about carotid stenosis with his family history of carotid stenosis. Past Medical History:   has no past medical history on file. Past Surgical History:   has no past surgical history on file. Home Medications:    Prior to Admission medications    Medication Sig Start Date End Date Taking? Authorizing Provider   atorvastatin (LIPITOR) 20 MG tablet Take 1 tablet by mouth daily 11/9/21   Keysha Andrade MD   metoprolol succinate (TOPROL XL) 25 MG extended release tablet Take 1 tablet by mouth daily  Patient not taking: Reported on 10/13/2021 10/7/21   ANDRAE Chambers - CNP   Fluticasone Propionate (FLONASE NA) by Nasal route    Historical Provider, MD     Allergies:  Penicillins    Social History:   reports that he has never smoked. He has never used smokeless tobacco. He reports that he does not drink alcohol and does not use drugs. Family History: family history is not on file. Review of Systems   Review of Systems   Constitutional: Negative. Respiratory: Negative. Cardiovascular: Negative. Gastrointestinal: Negative. Neurological: Negative.       OBJECTIVE:    Vital signs:    /68   Pulse 65   Ht 5' 10\" (1.778 m)   Wt 157 lb (71.2 kg)   SpO2 98%   BMI 22.53 kg/m²      Physical Exam:  Constitutional:  Comfortable and alert, NAD, appears stated age  Eyes: PERRL, sclera nonicteric  Neck:  Supple, no masses, no thyroidmegaly, no JVD  Skin:  Warm and dry; no rash or lesions  Heart: Regular, normal apex, S1 and S2 normal, no M/G/R  Lungs:  Normal respiratory effort; clear; no wheezing/rhonchi/rales  Abdomen: soft, non tender, + bowel sounds  Extremities:  No edema or cyanosis; no clubbing  Neuro: alert and oriented, moves legs and arms equally, normal mood and affect    Data Reviewed:      Stress test 10/7/2021:  Summary   Normal exercise EKG.   Hensley prognostic treadmill score suggests low-risk (score in range of >= +5). Cardiac Calcium score 10/2021:  LM                1   LAD              888   CX                8   RCA             0   TOTAL         897   REFERENCE NORMS FOR CALCIUM SCORE   No Identifiable  Minimal       Mild            Moderate        Severe   Calcification    Identifiable  Calcification Calcification  Calcification   .                    Calcification   ___1________6-97_______63-531____490-400____401 and above     NOTES:   A calcium score of 0 has a predictive value of greater than 95% for ruling out coronary stenosis of 50% or greater   Patients with coronary calcium scores greater than 400 have advanced plaque disease, have a 90% specificity for a least one obstructive coronary lesion, and are at high risk for development of symptomatic ischemic disease   Consider further assessment with stress testing with nuclear imaging or echocardiography to rule out inducible myocardial ischemia in patients with calcium score greater than 400    Barb Slaughter MD - 10/05/2021   Formatting of this note might be different from the original.   HISTORY:  atherosclerosis aorta noted on xray, hyperlipidemia, otherwise minimal risk factors Mixed hyperlipidemia; Atherosclerosis of aorta;Elevated blood-pressure reading, without diagnosis of hypertension.  Screen for atherosclerotic coronary artery   calcification.    COMPARISON:  None FINDINGS:   THORACIC AORTA:  Unremarkable.  No aneurysm   Ascending Aorta: 3.2 cm  (normal is less than 4 cm)   Descending Aorta: 2.8 cm  (normal is less than 3.5 cm)   AORTIC VALVE CALCIFICATION: None   HEART/PERICARDIUM:  Unremarkable   (The following structures are only partially included on this scan)   LUNGS/AIRWAYS:  Unremarkable.  No air space disease or mass   PLEURA: Unremarkable.  No pleural effusion or pneumothorax   MEDIASTINUM/ISAMAR:  Unremarkable   CHEST WALL/LOWER NECK:  Unremarkable   UPPER ABDOMEN: [place] is a thick-walled peripherally calcified hypoattenuating lesion in the anterior aspect of the spleen measuring 6.6 cm in size. BONES:  Unremarkable   OTHER:  None       Cardiology Labs Reviewed:   CBC: No results for input(s): WBC, HGB, HCT, PLT in the last 72 hours. BMP:No results for input(s): NA, K, CO2, BUN, CREATININE, LABGLOM, GLUCOSE in the last 72 hours. PT/INR: No results for input(s): PROTIME, INR in the last 72 hours. APTT:No results for input(s): APTT in the last 72 hours. FASTING LIPID PANEL:  Lab Results   Component Value Date    HDL 69 03/08/2022    HDL 53 05/05/2011    LDLCALC 91 03/08/2022    TRIG 62 05/05/2011     LIVER PROFILE:No results for input(s): AST, ALT, ALB in the last 72 hours. BNP: No results found for: PROBNP  Reviewed all labs and imaging today    Assessment:   CAD: stable, no angina; normal plain GXT 10/2021; based on calcium score of 897 in 10/2021  HLD: improved, LDL 81, continue statin  Family history of carotid stenosis    Plan:   1. Continue aspirin and crestor for CAD  2. Carotid duplex  3. Reviewed last lipids with patient, continue current regimen  4. Stay active along with a healthy diet  5.  Follow up with Dr. Kortney Huff, [address]  [phone]

## 2022-06-27 NOTE — PATIENT INSTRUCTIONS
Everything looks great today, good job!   Continue aspirin and crestor  Stay active along with a healthy diet  Carotid duplex, call [phone]  Follow up in 6 months with Dr. Mani Rogers

## 2022-07-20 ENCOUNTER — HOSPITAL ENCOUNTER (OUTPATIENT)
Dept: VASCULAR LAB | Age: 52
Discharge: HOME | End: 2022-07-20
Payer: COMMERCIAL

## 2022-07-20 DIAGNOSIS — I25.10 CORONARY ARTERY DISEASE INVOLVING NATIVE CORONARY ARTERY OF NATIVE HEART WITHOUT ANGINA PECTORIS: ICD-10-CM

## 2022-07-20 PROCEDURE — 93880 EXTRACRANIAL BILAT STUDY: CPT

## 2022-07-22 ENCOUNTER — TELEPHONE (OUTPATIENT)
Dept: CARDIOLOGY CLINIC | Age: 52
End: 2022-07-22

## 2022-07-22 NOTE — TELEPHONE ENCOUNTER
----- Message from ANDRAE Ordoñez CNP sent at 7/21/2022  4:46 PM EDT -----  Please let him know that preliminary report of carotid US does not show significant blockage. Continue aspirin and statin. Will let him know if official read differs.  Thank you